# Patient Record
Sex: MALE | Race: WHITE | HISPANIC OR LATINO | URBAN - METROPOLITAN AREA
[De-identification: names, ages, dates, MRNs, and addresses within clinical notes are randomized per-mention and may not be internally consistent; named-entity substitution may affect disease eponyms.]

---

## 2020-08-11 ENCOUNTER — EMERGENCY (EMERGENCY)
Facility: HOSPITAL | Age: 55
LOS: 0 days | Discharge: HOME | End: 2020-08-11
Attending: STUDENT IN AN ORGANIZED HEALTH CARE EDUCATION/TRAINING PROGRAM | Admitting: STUDENT IN AN ORGANIZED HEALTH CARE EDUCATION/TRAINING PROGRAM
Payer: MEDICAID

## 2020-08-11 VITALS
DIASTOLIC BLOOD PRESSURE: 71 MMHG | SYSTOLIC BLOOD PRESSURE: 140 MMHG | TEMPERATURE: 99 F | OXYGEN SATURATION: 97 % | HEART RATE: 89 BPM | WEIGHT: 199.96 LBS | HEIGHT: 68 IN | RESPIRATION RATE: 18 BRPM

## 2020-08-11 DIAGNOSIS — F17.200 NICOTINE DEPENDENCE, UNSPECIFIED, UNCOMPLICATED: ICD-10-CM

## 2020-08-11 DIAGNOSIS — E11.9 TYPE 2 DIABETES MELLITUS WITHOUT COMPLICATIONS: ICD-10-CM

## 2020-08-11 DIAGNOSIS — E78.5 HYPERLIPIDEMIA, UNSPECIFIED: ICD-10-CM

## 2020-08-11 DIAGNOSIS — Z76.0 ENCOUNTER FOR ISSUE OF REPEAT PRESCRIPTION: ICD-10-CM

## 2020-08-11 DIAGNOSIS — Z79.4 LONG TERM (CURRENT) USE OF INSULIN: ICD-10-CM

## 2020-08-11 DIAGNOSIS — I10 ESSENTIAL (PRIMARY) HYPERTENSION: ICD-10-CM

## 2020-08-11 PROCEDURE — 99282 EMERGENCY DEPT VISIT SF MDM: CPT

## 2020-08-11 RX ORDER — INSULIN GLARGINE 100 [IU]/ML
0 INJECTION, SOLUTION SUBCUTANEOUS
Qty: 0 | Refills: 0 | DISCHARGE

## 2020-08-11 RX ORDER — INSULIN ASPART 100 [IU]/ML
0 INJECTION, SOLUTION SUBCUTANEOUS
Qty: 0 | Refills: 0 | DISCHARGE

## 2020-08-11 NOTE — ED PROVIDER NOTE - ATTENDING CONTRIBUTION TO CARE
I personally evaluated the patient. I reviewed the Resident’s or Physician Assistant’s note (as assigned above), and agree with the findings and plan except as documented in my note.    55 year old male with a pmh of insulin dependent diabetes, htn hld presents here for a medication refill & insurance. patient recently moved from [place] and had his medication refilled 3 weeks ago there. He takes 45 lantus, novolog 35 bid, simvistatin 40 lisinopril 40. Patient also states he broke his lantus bottle  and came to the ed to change his [place] medication to [place] medicaid. Patient otherwise has no complaints no fever chills cough cp sob n/v abdominal pain urinary frequency urgency burning.    On exam  CONSTITUTIONAL: WA / WN / NAD  HEAD: NCAT  EYES: PERRL; EOMI;   ENT: Normal pharynx; mucous membranes pink/moist, no erythema.  NECK: Supple; no meningeal signs  CARD: RRR; nl S1/S2; no M/R/G  RESP: Respiratory rate and effort are normal; breath sounds clear and equal bilaterally.  ABD: Soft, NT ND nl bowel sounds; no masses; no rebound  MSK/EXT: No gross deformities; full range of motion.  SKIN: Warm and dry;   NEURO: AAOx3  PSYCH: Memory Intact, Normal Affect

## 2020-08-11 NOTE — ED PROVIDER NOTE - PHYSICAL EXAMINATION
CONSTITUTIONAL: poor hygiene; disheveled appearing; cooperative.   SKIN: warm, dry  HEAD: Normocephalic; atraumatic.  EYES: EOMI, normal sclera and conjunctiva   ENT: No nasal discharge; airway clear.  NECK: Supple; non tender.  CARD: S1, S2 normal; no murmurs, gallops, or rubs. Regular rate and rhythm.   RESP: No wheezes, rales or rhonchi.  ABD: soft ntnd  EXT: Normal ROM.  No clubbing, cyanosis or edema.   LYMPH: No acute cervical adenopathy.  NEURO: Alert, oriented, grossly unremarkable  PSYCH: Cooperative, appropriate.

## 2020-08-11 NOTE — ED PROVIDER NOTE - NS ED ROS FT
Eyes:  No visual changes, eye pain or discharge.  ENMT:  No hearing changes, pain, discharge or infections. No neck pain or stiffness.  Cardiac:  No chest pain, SOB or edema. No chest pain with exertion.  Respiratory:  No cough or respiratory distress. No hemoptysis. No history of asthma or RAD.  GI:  No nausea, vomiting, diarrhea or abdominal pain.  :  No dysuria, frequency or burning.  MS:  No myalgia, muscle weakness, joint pain or back pain.  Neuro:  No headache or weakness.  No LOC.  Skin:  No skin rash.   Endocrine: No history of thyroid disease +hx of diabetes.

## 2020-08-11 NOTE — ED PROVIDER NOTE - CLINICAL SUMMARY MEDICAL DECISION MAKING FREE TEXT BOX
55 year old male with a pmh of insulin dependent diabetes, htn hld presents here for a medication refill & insurance. patient recently moved from [place] and had his medication refilled 3 weeks ago there. Patient came to ED in order to have his medication refilled and [place] med[place] insurance [place] medicaid. Financial / social work was planned to see patient however patient eloped.

## 2020-08-11 NOTE — ED PROVIDER NOTE - OBJECTIVE STATEMENT
The pt is a 55 year old male with a history of DM2, HTN presenting for "I need help getting insurance." Pt states he recently moved to [place] from [place] ~20 days ago. States he is almost out of his Novolog (takes 35 in morning and 35 at night) and Lantus (45 daily) and needs refills. Today he was opening his Novolog and it broke. Last took Lantus/Novolog yesterday. FSBG in ED 75. States he can't pay ~400$ for insulin and needs insurance here in [place]. No fevers, nausea/vomiting/diarrhea, abdominal pain, weight loss.

## 2024-10-30 VITALS
DIASTOLIC BLOOD PRESSURE: 64 MMHG | WEIGHT: 199.96 LBS | HEART RATE: 55 BPM | SYSTOLIC BLOOD PRESSURE: 131 MMHG | RESPIRATION RATE: 16 BRPM | HEIGHT: 68 IN | OXYGEN SATURATION: 96 %

## 2024-10-30 RX ORDER — SODIUM CHLORIDE 9 MG/ML
1000 INJECTION, SOLUTION INTRAMUSCULAR; INTRAVENOUS; SUBCUTANEOUS
Refills: 0 | Status: COMPLETED | OUTPATIENT
Start: 2024-10-31 | End: 2024-10-31

## 2024-10-30 RX ORDER — CLOPIDOGREL 75 MG/1
75 TABLET ORAL ONCE
Refills: 0 | Status: COMPLETED | OUTPATIENT
Start: 2024-10-31 | End: 2024-10-31

## 2024-10-30 RX ORDER — ASPIRIN/MAG CARB/ALUMINUM AMIN 325 MG
81 TABLET ORAL ONCE
Refills: 0 | Status: COMPLETED | OUTPATIENT
Start: 2024-10-31 | End: 2024-10-31

## 2024-10-30 NOTE — H&P ADULT - NSICDXPASTMEDICALHX_GEN_ALL_CORE_FT
PAST MEDICAL HISTORY:  CAD (coronary artery disease)     Diabetes     Hyperlipidemia     PVD (peripheral vascular disease)      PAST MEDICAL HISTORY:  CAD (coronary artery disease)     Hyperlipidemia     PVD (peripheral vascular disease)     Type 1 diabetes mellitus      PAST MEDICAL HISTORY:  CAD (coronary artery disease)     CVA (cerebrovascular accident)     Diabetic neuropathy     Hepatitis B     Hyperlipidemia     PVD (peripheral vascular disease)     Type 1 diabetes mellitus

## 2024-10-30 NOTE — H&P ADULT - CARDIOVASCULAR
regular rate and rhythm/S1 S2 present/no gallops/no rub/no murmur/no pedal edema/vascular regular rate and rhythm/S1 S2 present/no gallops/no rub/no murmur/no pedal edema/murmur/vascular

## 2024-10-30 NOTE — H&P ADULT - HISTORY OF PRESENT ILLNESS
Cardiologist: Dr. Cortez  Pharmacy:  Escort:       59 yr old M with PMHx of HTN, HPL, DM, Hep B, CAD s/p prior PCI (3/2024-per office note), PVD s/p prior angioplasty (3/2024) who initially presented to cardiologist c/o atypical chest pain____    Patient denies any N/V, diaphoresis, palpitations, dizziness, PND, orthopnea, LE edema, recent travel or sick contacts.     Patient further admits to claudication____    TTE 10/18/24: normal LV size/motion, EF:60%, trace to mild MR, trace TR, EF:60%.   Exercise Stress Test 10/29/24: moderate Sharpe treadmill score of -5, the exercise was terminated due to chest pain and dyspnea. There is 1mm depression on Lead II, III, AVF, V3-V5.    LE duplex 10/22/24: mild atherosclerosis disease throughout the lower extremities. Mild stenosis in bilateral femoral, popliteal artery, posterior tibilal, dorsalis pedis and right anterior tibial artery. There is limited study on the left anterior tibial.       In light of patients risk factors, CCS Angina Class III equivalent symptoms and claudication, above abnormal studies, patient now presents for recommended cardiac catheterization w/ possible intervention and LE angiogram.  Cardiologist: Dr. Cortez  Pharmacy:  Escort:     59M with PMHx of HTN, HPL, DM Type 1, Hep B, CAD s/p prior PCI (3/2024-per office note), PVD s/p prior angioplasty (3/2024) who initially presented to cardiologist c/o atypical chest pain per MD note as well as claudication. Pt reports recent peripheral angiogram this month at [facility] however unsure of results (collateral attempted with [facility] and OP Cardiologist).  Pt denies chest pain, SOB, OCAMPO,  palpitations, diaphoresis, orthopnea/PND, cough, leg swelling, dizziness, LOC, fall, syncope, N/V/D, fever/chills, hematuria, BRBPR, melena/hematochezia.    --TTE 10/18/24: normal LV size/motion, EF:60%, trace to mild MR, trace TR, EF:60%.   --Exercise Stress Test 10/29/24: moderate Sharpe treadmill score of -5, the exercise was terminated due to chest pain and dyspnea. There is 1mm depression on Lead II, III, AVF, V3-V5.  --LE duplex 10/22/24: mild atherosclerosis disease throughout the lower extremities. Mild stenosis in bilateral femoral, popliteal artery, posterior tibilal, dorsalis pedis and right anterior tibial artery. There is limited study on the left anterior tibial.     In light of risk factors, CCS Angina Class III equivalent symptoms, above abnormal studies, patient now presents for recommended cardiac catheterization w/ possible intervention.  Cardiologist: Dr. Cortez  Pharmacy: [facility]  Escort: brother, sister, or son  (each on call per pt)     59M with PMHx of HTN, HLD, DM Type 1, chronic Hepatitis B (dx at 3 y/o), CAD s/p prior PCI (3/2024-per office note), PAD s/p prior angioplasty (3/2024; recent Oct 2024 ?ballooning at [facility]) who initially presented to cardiologist c/o atypical chest pain per MD note as well as claudication. Pt reports recent peripheral angiogram this month at [facility] however unsure of results (collateral attempted with [facility] and OP Cardiologist, however unable to obtain).  Pt denies chest pain, SOB, OCAMPO,  palpitations, diaphoresis, orthopnea/PND, cough, leg swelling, dizziness, LOC, fall, syncope, N/V/D, fever/chills, hematuria, BRBPR, melena/hematochezia.    --TTE 10/18/24: normal LV size/motion, EF:60%, trace to mild MR, trace TR.   --Exercise Stress Test 10/29/24: moderate Sharpe treadmill score of -5, the exercise was terminated due to chest pain and dyspnea. There is 1mm depression on Lead II, III, AVF, V3-V5.  --LE duplex 10/22/24: mild atherosclerosis disease throughout the lower extremities. Mild stenosis in bilateral femoral, popliteal artery, posterior tibial dorsalis pedis and right anterior tibial artery. There is limited study on the left anterior tibial.     In light of risk factors, previous CAD, atypical chest pain, above abnormal studies, patient now presents for recommended cardiac catheterization w/ possible intervention.  Cardiologist: Dr. Cortez  Pharmacy: [facility]  Escort: brother, sister, or son  (each on call per pt)     59M with PMHx of HTN, HLD, DM Type 1 c/b neuropathy, chronic Hepatitis B (dx at 3 y/o), CAD s/p prior PCI (3/2024-per office note), PAD s/p prior angioplasty (3/2024; recent Oct 2024 ?ballooning at [facility]), mild CVA 2022 (attributed to hypoglycemia, no residual deficits), who initially presented to cardiologist c/o atypical chest pain per MD note as well as claudication. Pt reports recent peripheral angiogram this month at [facility] however unsure of results (collateral attempted with [facility] and OP Cardiologist, however unable to obtain).  Pt denies chest pain, SOB, OCAMPO,  palpitations, diaphoresis, orthopnea/PND, cough, leg swelling, dizziness, LOC, fall, syncope, N/V/D, fever/chills, hematuria, BRBPR, melena/hematochezia.    --TTE 10/18/24: normal LV size/motion, EF:60%, trace to mild MR, trace TR.   --Exercise Stress Test 10/29/24: moderate Sharpe treadmill score of -5, the exercise was terminated due to chest pain and dyspnea. There is 1mm depression on Lead II, III, AVF, V3-V5.  --LE duplex 10/22/24: mild atherosclerosis disease throughout the lower extremities. Mild stenosis in bilateral femoral, popliteal artery, posterior tibial dorsalis pedis and right anterior tibial artery. There is limited study on the left anterior tibial.     In light of risk factors, previous CAD, atypical chest pain, above abnormal studies, patient now presents for recommended cardiac catheterization w/ possible intervention.

## 2024-10-30 NOTE — H&P ADULT - NSHPLABSRESULTS_GEN_ALL_CORE
12.9   7.16  )-----------( 262      ( 31 Oct 2024 12:44 )             40.0       10-31    141  |  104  |  21  ----------------------------<  118[H]  4.1   |  26  |  1.16    Ca    9.3      31 Oct 2024 12:44  Mg     2.1     10-31        PT/INR - ( 31 Oct 2024 12:44 )   PT: 11.2 sec;   INR: 0.96          PTT - ( 31 Oct 2024 12:44 )  PTT:30.7 sec    CARDIAC MARKERS ( 31 Oct 2024 12:44 )  x     / x     / x     / x     / 2.0 ng/mL        Urinalysis Basic - ( 31 Oct 2024 12:44 )    Color: x / Appearance: x / SG: x / pH: x  Gluc: 118 mg/dL / Ketone: x  / Bili: x / Urobili: x   Blood: x / Protein: x / Nitrite: x   Leuk Esterase: x / RBC: x / WBC x   Sq Epi: x / Non Sq Epi: x / Bacteria: x

## 2024-10-31 ENCOUNTER — OUTPATIENT (OUTPATIENT)
Dept: OUTPATIENT SERVICES | Facility: HOSPITAL | Age: 59
LOS: 1 days | Discharge: HOME | End: 2024-10-31
Payer: MEDICARE

## 2024-10-31 DIAGNOSIS — Z95.5 PRESENCE OF CORONARY ANGIOPLASTY IMPLANT AND GRAFT: Chronic | ICD-10-CM

## 2024-10-31 LAB
A1C WITH ESTIMATED AVERAGE GLUCOSE RESULT: 7.7 % — HIGH (ref 4–5.6)
ANION GAP SERPL CALC-SCNC: 11 MMOL/L — SIGNIFICANT CHANGE UP (ref 5–17)
APTT BLD: 30.7 SEC — SIGNIFICANT CHANGE UP (ref 24.5–35.6)
BASOPHILS # BLD AUTO: 0.07 K/UL — SIGNIFICANT CHANGE UP (ref 0–0.2)
BASOPHILS NFR BLD AUTO: 1 % — SIGNIFICANT CHANGE UP (ref 0–2)
BUN SERPL-MCNC: 21 MG/DL — SIGNIFICANT CHANGE UP (ref 7–23)
CALCIUM SERPL-MCNC: 9.3 MG/DL — SIGNIFICANT CHANGE UP (ref 8.4–10.5)
CHLORIDE SERPL-SCNC: 104 MMOL/L — SIGNIFICANT CHANGE UP (ref 96–108)
CHOLEST SERPL-MCNC: 182 MG/DL — SIGNIFICANT CHANGE UP
CK MB CFR SERPL CALC: 2 NG/ML — SIGNIFICANT CHANGE UP (ref 0–6.7)
CK SERPL-CCNC: 129 U/L — SIGNIFICANT CHANGE UP (ref 30–200)
CO2 SERPL-SCNC: 26 MMOL/L — SIGNIFICANT CHANGE UP (ref 22–31)
CREAT SERPL-MCNC: 1.16 MG/DL — SIGNIFICANT CHANGE UP (ref 0.5–1.3)
EGFR: 72 ML/MIN/1.73M2 — SIGNIFICANT CHANGE UP
EOSINOPHIL # BLD AUTO: 0.32 K/UL — SIGNIFICANT CHANGE UP (ref 0–0.5)
EOSINOPHIL NFR BLD AUTO: 4.5 % — SIGNIFICANT CHANGE UP (ref 0–6)
ESTIMATED AVERAGE GLUCOSE: 174 MG/DL — HIGH (ref 68–114)
GLUCOSE BLDC GLUCOMTR-MCNC: 108 MG/DL — HIGH (ref 70–99)
GLUCOSE BLDC GLUCOMTR-MCNC: 116 MG/DL — HIGH (ref 70–99)
GLUCOSE BLDC GLUCOMTR-MCNC: 141 MG/DL — HIGH (ref 70–99)
GLUCOSE BLDC GLUCOMTR-MCNC: 47 MG/DL — CRITICAL LOW (ref 70–99)
GLUCOSE BLDC GLUCOMTR-MCNC: 50 MG/DL — CRITICAL LOW (ref 70–99)
GLUCOSE BLDC GLUCOMTR-MCNC: 82 MG/DL — SIGNIFICANT CHANGE UP (ref 70–99)
GLUCOSE SERPL-MCNC: 118 MG/DL — HIGH (ref 70–99)
HCT VFR BLD CALC: 40 % — SIGNIFICANT CHANGE UP (ref 39–50)
HDLC SERPL-MCNC: 74 MG/DL — SIGNIFICANT CHANGE UP
HGB BLD-MCNC: 12.9 G/DL — LOW (ref 13–17)
IMM GRANULOCYTES NFR BLD AUTO: 0.3 % — SIGNIFICANT CHANGE UP (ref 0–0.9)
INR BLD: 0.96 — SIGNIFICANT CHANGE UP (ref 0.85–1.16)
LIPID PNL WITH DIRECT LDL SERPL: 94 MG/DL — SIGNIFICANT CHANGE UP
LYMPHOCYTES # BLD AUTO: 1.64 K/UL — SIGNIFICANT CHANGE UP (ref 1–3.3)
LYMPHOCYTES # BLD AUTO: 22.9 % — SIGNIFICANT CHANGE UP (ref 13–44)
MAGNESIUM SERPL-MCNC: 2.1 MG/DL — SIGNIFICANT CHANGE UP (ref 1.6–2.6)
MCHC RBC-ENTMCNC: 30 PG — SIGNIFICANT CHANGE UP (ref 27–34)
MCHC RBC-ENTMCNC: 32.3 G/DL — SIGNIFICANT CHANGE UP (ref 32–36)
MCV RBC AUTO: 93 FL — SIGNIFICANT CHANGE UP (ref 80–100)
MONOCYTES # BLD AUTO: 0.75 K/UL — SIGNIFICANT CHANGE UP (ref 0–0.9)
MONOCYTES NFR BLD AUTO: 10.5 % — SIGNIFICANT CHANGE UP (ref 2–14)
NEUTROPHILS # BLD AUTO: 4.36 K/UL — SIGNIFICANT CHANGE UP (ref 1.8–7.4)
NEUTROPHILS NFR BLD AUTO: 60.8 % — SIGNIFICANT CHANGE UP (ref 43–77)
NON HDL CHOLESTEROL: 108 MG/DL — SIGNIFICANT CHANGE UP
NRBC # BLD: 0 /100 WBCS — SIGNIFICANT CHANGE UP (ref 0–0)
PLATELET # BLD AUTO: 262 K/UL — SIGNIFICANT CHANGE UP (ref 150–400)
POTASSIUM SERPL-MCNC: 4.1 MMOL/L — SIGNIFICANT CHANGE UP (ref 3.5–5.3)
POTASSIUM SERPL-SCNC: 4.1 MMOL/L — SIGNIFICANT CHANGE UP (ref 3.5–5.3)
PROTHROM AB SERPL-ACNC: 11.2 SEC — SIGNIFICANT CHANGE UP (ref 9.9–13.4)
RBC # BLD: 4.3 M/UL — SIGNIFICANT CHANGE UP (ref 4.2–5.8)
RBC # FLD: 13.8 % — SIGNIFICANT CHANGE UP (ref 10.3–14.5)
SODIUM SERPL-SCNC: 141 MMOL/L — SIGNIFICANT CHANGE UP (ref 135–145)
TRIGL SERPL-MCNC: 78 MG/DL — SIGNIFICANT CHANGE UP
WBC # BLD: 7.16 K/UL — SIGNIFICANT CHANGE UP (ref 3.8–10.5)
WBC # FLD AUTO: 7.16 K/UL — SIGNIFICANT CHANGE UP (ref 3.8–10.5)

## 2024-10-31 PROCEDURE — 82553 CREATINE MB FRACTION: CPT

## 2024-10-31 PROCEDURE — 93458 L HRT ARTERY/VENTRICLE ANGIO: CPT | Mod: 26

## 2024-10-31 PROCEDURE — 83735 ASSAY OF MAGNESIUM: CPT

## 2024-10-31 PROCEDURE — 85730 THROMBOPLASTIN TIME PARTIAL: CPT

## 2024-10-31 PROCEDURE — 99152 MOD SED SAME PHYS/QHP 5/>YRS: CPT

## 2024-10-31 PROCEDURE — 83036 HEMOGLOBIN GLYCOSYLATED A1C: CPT

## 2024-10-31 PROCEDURE — 93005 ELECTROCARDIOGRAM TRACING: CPT

## 2024-10-31 PROCEDURE — C1769: CPT

## 2024-10-31 PROCEDURE — 82550 ASSAY OF CK (CPK): CPT

## 2024-10-31 PROCEDURE — 80048 BASIC METABOLIC PNL TOTAL CA: CPT

## 2024-10-31 PROCEDURE — C1894: CPT

## 2024-10-31 PROCEDURE — 85025 COMPLETE CBC W/AUTO DIFF WBC: CPT

## 2024-10-31 PROCEDURE — 82962 GLUCOSE BLOOD TEST: CPT

## 2024-10-31 PROCEDURE — 85610 PROTHROMBIN TIME: CPT

## 2024-10-31 PROCEDURE — C1887: CPT

## 2024-10-31 PROCEDURE — 93458 L HRT ARTERY/VENTRICLE ANGIO: CPT

## 2024-10-31 PROCEDURE — 80061 LIPID PANEL: CPT

## 2024-10-31 PROCEDURE — 93010 ELECTROCARDIOGRAM REPORT: CPT

## 2024-10-31 RX ORDER — SODIUM CHLORIDE 9 MG/ML
1000 INJECTION, SOLUTION INTRAMUSCULAR; INTRAVENOUS; SUBCUTANEOUS
Refills: 0 | Status: ACTIVE | OUTPATIENT
Start: 2024-10-31 | End: 2024-10-31

## 2024-10-31 RX ORDER — INSULIN ASPART 100 [IU]/ML
15 INJECTION, SOLUTION INTRAVENOUS; SUBCUTANEOUS
Refills: 0 | DISCHARGE

## 2024-10-31 RX ORDER — GLUCAGON INJECTION, SOLUTION 1 MG/.2ML
1 INJECTION, SOLUTION SUBCUTANEOUS ONCE
Refills: 0 | Status: ACTIVE | OUTPATIENT
Start: 2024-10-31 | End: 2025-09-29

## 2024-10-31 RX ORDER — CHLORHEXIDINE GLUCONATE 40 MG/ML
1 SOLUTION TOPICAL ONCE
Refills: 0 | Status: ACTIVE | OUTPATIENT
Start: 2024-10-31

## 2024-10-31 RX ORDER — GABAPENTIN 300 MG/1
0 CAPSULE ORAL
Refills: 0 | DISCHARGE

## 2024-10-31 RX ORDER — ASPIRIN/MAG CARB/ALUMINUM AMIN 325 MG
1 TABLET ORAL
Refills: 0 | DISCHARGE

## 2024-10-31 RX ORDER — INSULIN LISPRO 100/ML
VIAL (ML) SUBCUTANEOUS
Refills: 0 | Status: ACTIVE | OUTPATIENT
Start: 2024-10-31 | End: 2025-09-29

## 2024-10-31 RX ORDER — AMLODIPINE BESYLATE 10 MG
1 TABLET ORAL
Refills: 0 | DISCHARGE

## 2024-10-31 RX ORDER — LISINOPRIL 40 MG
1 TABLET ORAL
Refills: 0 | DISCHARGE

## 2024-10-31 RX ORDER — SODIUM CHLORIDE 9 MG/ML
250 INJECTION, SOLUTION INTRAMUSCULAR; INTRAVENOUS; SUBCUTANEOUS ONCE
Refills: 0 | Status: COMPLETED | OUTPATIENT
Start: 2024-10-31 | End: 2024-10-31

## 2024-10-31 RX ORDER — RANOLAZINE 500 MG/1
1 TABLET, FILM COATED, EXTENDED RELEASE ORAL
Refills: 0 | DISCHARGE

## 2024-10-31 RX ORDER — CLOPIDOGREL 75 MG/1
1 TABLET ORAL
Refills: 0 | DISCHARGE

## 2024-10-31 RX ORDER — INSULIN LISPRO 100/ML
VIAL (ML) SUBCUTANEOUS AT BEDTIME
Refills: 0 | Status: ACTIVE | OUTPATIENT
Start: 2024-10-31 | End: 2025-09-29

## 2024-10-31 RX ORDER — BISOPROLOL FUMARATE 10 MG/1
1 TABLET, FILM COATED ORAL
Refills: 0 | DISCHARGE

## 2024-10-31 RX ADMIN — Medication 81 MILLIGRAM(S): at 15:10

## 2024-10-31 RX ADMIN — Medication 25 GRAM(S): at 18:25

## 2024-10-31 RX ADMIN — CLOPIDOGREL 75 MILLIGRAM(S): 75 TABLET ORAL at 15:10

## 2024-10-31 RX ADMIN — SODIUM CHLORIDE 75 MILLILITER(S): 9 INJECTION, SOLUTION INTRAMUSCULAR; INTRAVENOUS; SUBCUTANEOUS at 15:09

## 2024-10-31 RX ADMIN — SODIUM CHLORIDE 75 MILLILITER(S): 9 INJECTION, SOLUTION INTRAMUSCULAR; INTRAVENOUS; SUBCUTANEOUS at 14:17

## 2024-10-31 RX ADMIN — SODIUM CHLORIDE 500 MILLILITER(S): 9 INJECTION, SOLUTION INTRAMUSCULAR; INTRAVENOUS; SUBCUTANEOUS at 14:17

## 2024-10-31 RX ADMIN — Medication 12.5 GRAM(S): at 19:10

## 2024-10-31 NOTE — CHART NOTE - NSCHARTNOTEFT_GEN_A_CORE
Writer notified & called to bedside for hypoglycemia event at 1700. Pt is T1DM, and a hypoglycemia event occurred during cath lab holding while pt waiting for procedure and NPO. Pt remained alert and VSS. 25g of D50% was administered with resolution of FSBG from 47 to 141.  Prior to this event, it was not known pt was wearing insulin pump for T1DM with continuous Novolog administration.  The pump was now turned off via pt's handheld monitor.  FSBG continued to be monitored closely via pt's Dexcom and intermittent FSBG.     Cath RN Dominique at bedside assisting with care; pt and RN aware of plan. Dr. Villasenor notified and presented to bedside.     1800: Insulin Pump form completed with pt.  1900: Pt's Dexcom had been closely monitored at bedside, noted w/ blood glucose dropping to 100s, to 90s, to 82.  12.5g D50% given Pre-procedure.  19:34 re-check FSBG 108. Writer notified & called to bedside for hypoglycemia event at 1700. Pt is T1DM, and a hypoglycemia event occurred during cath lab holding while pt waiting for procedure and NPO. Pt remained alert and VSS. 25g of D50% was administered with resolution of FSBG from 47 to 141.  Prior to this event, it was not known pt was wearing insulin pump for T1DM with continuous Novolog administration.  The pump was now turned off via pt's handheld monitor.  FSBG continued to be monitored closely via pt's Dexcom and intermittent FSBG.     Cath RN Dominique at bedside assisting with care; pt and RN aware of plan. Dr. Villasenor notified and presented to bedside.     In interim, Insulin Pump form completed with pt.    1900: Pt's Dexcom had been closely monitored at bedside, noted w/ blood glucose dropping to 100s, to 90s, to 82.  12.5g D50% given Pre-procedure.  19:34 re-check FSBG 108.

## 2024-10-31 NOTE — PROGRESS NOTE ADULT - SUBJECTIVE AND OBJECTIVE BOX
Interventional Cardiology PA Post PCI SDA Discharge Note    Patient without complaints. Ambulated and voided without difficulties    Afebrile, VSS    PRESCRIPTIONS/HOME MEDICATIONS:  amLODIPine 5 mg oral tablet: 1 tab(s) orally once a day  Aspirin EC 81 mg oral delayed release tablet: 1 tab(s) orally once a day  atorvastatin 40 mg oral tablet: 1 tab(s) orally once a day (at bedtime)  atorvastatin 80 mg oral tablet: 1 tab(s) orally once a day (at bedtime)  bisoprolol 5 mg oral tablet: 1 tab(s) orally once a day  gabapentin 100 mg oral tablet: orally 2 times a day  lisinopril 40 mg oral tablet: 1 tab(s) orally once a day  NovoLOG: continuous Omnipod insulin pump on Right thigh (0.35 units/hr 12a-7a; 7a-12a 1 unit per hour)  NovoLOG 100 units/mL subcutaneous solution: 15 unit(s) subcutaneous 2 times a day (with meals) based on FSBG/meal, usually takes between 15-20 units.  Plavix 75 mg oral tablet: 1 tab(s) orally once a day  Ranexa 500 mg oral tablet, extended release: 1 tab(s) orally 2 times a day        Ext:    	Right/Left             Groin:       hematoma,     bruit, dressing; C/D/I  		Right/Left             Radial :    hematoma,     bleeding, dressing; C/D/I      Pulses:    intact RAD/DP/PT to baseline     A/P:  s/p PCI: PTCA/CSI Atherectomy/Rotational Atherectomy/BMS/BABS    1.	  Follow-up with PMD/Cardiologist ___________ in 72 hours.  2.       Post procedure labs/EKG reviewed and stable.    3.       Pt given instructions on importance of taking antiplatelet medication(s).    4. 	  Stable for discharge as per attending Dr. _________ after bed rest, pt voids, groin/wrist stable and 30 minutes of ambulation.  5.       Prescriptions for Aspirin/Plavix/Brilinta/Effient e-prescribed and submitted to patient's pharmacy Yes/No_______.  No Aspirin/Plavix/Brilinta/Effient prescribed due to ____________.  6.       Patient will continue/Start Statin (Name and dose).  No Statin Prescribed due to ____________.  7.       Patient will continue All Other Home Medications.  (PLEASE NOTE IF ANY CHANGES).  8.       Is patient a Current Smoker: Yes/No?  If yes, Patient was Counseled on importance of smoking cessation. Yes  9.	             *Education on benefits of Cardiac Rehab provided to patient: Yes         *Referral and Prescription Given for Cardiac Rehab: Yes/No.  If No, Why Not?  (see reasons below)         *Pt given list of locations & instructed to contact their insurance company to review list of participating providers. Yes          *Pt instructed to bring Cardiac Rehab prescription with them to Cardiology Follow up appointment for assistance with enrollment: Yes         *Pt discharge copies detail cardiovascular history, medications, testing/treatments OR pt has created a patient portal account and instructed to provider their records at their 1st appointment: Yes    *** Reasons for No Cardiac Rehab Referral Rx (Document 1 or more options):                Patient Refused            Medical Reason: ex has Home Care, Home PT, incomplete revascularization            Patient lacks medical coverage for Cardiac Rehab            Pt discharged to Nursing Care/[facility]/Long term Care Facility            Patient Lacks Transportation or no cardiac rehab within 60 minutes driving range            Patient already participates in Cardiac Rehab            Other: (provide details) ex: Hospice patient    10. CVD ( (STEMI/NSTEMI/ACS/UA &/OR Post PCI this admission): GLP-1 receptor agonist, SGLT2 inhibitor meds discussed w/ patient and encouraged to discuss further with PMD or endo at next visit: Yes  11. Discharge forms signed and copies in chart        Interventional Cardiology PA Post Dx Cath SDA Discharge Note    Patient without complaints. Ambulated and voided without difficulties    Afebrile, VSS    PRESCRIPTIONS/HOME MEDICATIONS:  amLODIPine 5 mg oral tablet: 1 tab(s) orally once a day  Aspirin EC 81 mg oral delayed release tablet: 1 tab(s) orally once a day  atorvastatin 40 mg oral tablet: 1 tab(s) orally once a day (at bedtime)  atorvastatin 80 mg oral tablet: 1 tab(s) orally once a day (at bedtime)  bisoprolol 5 mg oral tablet: 1 tab(s) orally once a day  gabapentin 100 mg oral tablet: orally 2 times a day  lisinopril 40 mg oral tablet: 1 tab(s) orally once a day  NovoLOG: continuous Omnipod insulin pump on Right thigh (0.35 units/hr 12a-7a; 7a-12a 1 unit per hour)  NovoLOG 100 units/mL subcutaneous solution: 15 unit(s) subcutaneous 2 times a day (with meals) based on FSBG/meal, usually takes between 15-20 units.  Plavix 75 mg oral tablet: 1 tab(s) orally once a day  Ranexa 500 mg oral tablet, extended release: 1 tab(s) orally 2 times a day    Ext: Right Groin: NO hematoma, bruit, dressing; C/D/I    Pulses: intact DP/PT to baseline     A/P: 59M with PMHx of HTN, HLD, DM Type 1 c/b neuropathy, chronic Hepatitis B (dx at 3 y/o), CAD s/p prior PCI (3/2024-per office note), PAD s/p prior angioplasty (3/2024; recent Oct 2024 ?ballooning at [facility]), mild CVA 2022 (attributed to hypoglycemia, no residual deficits), who initially presented to cardiologist c/o atypical chest pain per MD note as well as claudication. Pt reports recent peripheral angiogram this month at [facility] however unsure of results (collateral attempted with [facility] and OP Cardiologist, however unable to obtain); who in light of risk factors, previous CAD, atypical chest pain, above abnormal studies, patient presented to [facility] for recommended cardiac catheterization w/ possible intervention.     Pt s/p dx [facility] (10/31/24): LM normal. pLAD stent patent w/ mild ISR. D1 mild diffuse dz. pLCx 30%, followed by dLCx 50%. OM1 mild diffuse. pRCA 60% (FFR negative). RPDA mild diffuse dz. LVEDP 15 mmHg. IC/Fellow: Dr. Villasenor/Dr. Pina Jang.    1. Follow-up with PMD/Cardiologist Dr. Villasenor in 1-2 weeks  2. Stable for discharge as per attending Dr. Cortez after bed rest, pt voids, groin stable and 30 minutes of ambulation.  3. Patient will INCREASE to atorvastatin 80 mg PO qhs (LDL 94). Sent to pt's pharmacy.  4. Patient will continue all other home meds. C/w home ASA 81 mg PO qd and Plavix 75 mg PO qd.   5. Discharge forms signed and copies in chart        Interventional Cardiology PA Post Dx Cath SDA Discharge Note    Patient without complaints. Ambulated and voided without difficulties    Afebrile, VSS    PRESCRIPTIONS/HOME MEDICATIONS:  amLODIPine 5 mg oral tablet: 1 tab(s) orally once a day  Aspirin EC 81 mg oral delayed release tablet: 1 tab(s) orally once a day  atorvastatin 40 mg oral tablet: 1 tab(s) orally once a day (at bedtime)  atorvastatin 80 mg oral tablet: 1 tab(s) orally once a day (at bedtime)  bisoprolol 5 mg oral tablet: 1 tab(s) orally once a day  gabapentin 100 mg oral tablet: orally 2 times a day  lisinopril 40 mg oral tablet: 1 tab(s) orally once a day  NovoLOG: continuous Omnipod insulin pump on Right thigh (0.35 units/hr 12a-7a; 7a-12a 1 unit per hour)  NovoLOG 100 units/mL subcutaneous solution: 15 unit(s) subcutaneous 2 times a day (with meals) based on FSBG/meal, usually takes between 15-20 units.  Plavix 75 mg oral tablet: 1 tab(s) orally once a day  Ranexa 500 mg oral tablet, extended release: 1 tab(s) orally 2 times a day    Ext: Right Radial: NO hematoma, bruit, dressing; C/D/I    Pulses: intact radial to baseline     A/P: 59M with PMHx of HTN, HLD, DM Type 1 c/b neuropathy, chronic Hepatitis B (dx at 3 y/o), CAD s/p prior PCI (3/2024-per office note), PAD s/p prior angioplasty (3/2024; recent Oct 2024 ?ballooning at [facility]), mild CVA 2022 (attributed to hypoglycemia, no residual deficits), who initially presented to cardiologist c/o atypical chest pain per MD note as well as claudication. Pt reports recent peripheral angiogram this month at [facility] however unsure of results (collateral attempted with [facility] and OP Cardiologist, however unable to obtain); who in light of risk factors, previous CAD, atypical chest pain, above abnormal studies, patient presented to [facility] for recommended cardiac catheterization w/ possible intervention.     Pt s/p dx [facility] (10/31/24): LM normal. pLAD stent patent w/ mild ISR. D1 mild diffuse dz. pLCx 30%, followed by dLCx 50%. OM1 mild diffuse. pRCA 60% (FFR negative). RPDA mild diffuse dz. LVEDP 15 mmHg. IC/Fellow: Dr. Villasenor/Dr. Pina Jang.    1. Follow-up with PMD/Cardiologist Dr. Villasenor in 1-2 weeks  2. Stable for discharge as per attending Dr. Cortez after bed rest, pt voids, groin stable and 30 minutes of ambulation.  3. Patient will INCREASE to atorvastatin 80 mg PO qhs (LDL 94). Sent to pt's pharmacy.  4. Patient will continue all other home meds. C/w home ASA 81 mg PO qd and Plavix 75 mg PO qd.   5. Discharge forms signed and copies in chart        Interventional Cardiology PA Post Dx Cath SDA Discharge Note    Patient without complaints. Ambulated and voided without difficulties    Afebrile, VSS    PRESCRIPTIONS/HOME MEDICATIONS:  amLODIPine 5 mg oral tablet: 1 tab(s) orally once a day  Aspirin EC 81 mg oral delayed release tablet: 1 tab(s) orally once a day  atorvastatin 40 mg oral tablet: 1 tab(s) orally once a day (at bedtime)  atorvastatin 80 mg oral tablet: 1 tab(s) orally once a day (at bedtime)  bisoprolol 5 mg oral tablet: 1 tab(s) orally once a day  gabapentin 100 mg oral tablet: orally 2 times a day  lisinopril 40 mg oral tablet: 1 tab(s) orally once a day  NovoLOG: continuous Omnipod insulin pump on Right thigh (0.35 units/hr 12a-7a; 7a-12a 1 unit per hour)  NovoLOG 100 units/mL subcutaneous solution: 15 unit(s) subcutaneous 2 times a day (with meals) based on FSBG/meal, usually takes between 15-20 units.  Plavix 75 mg oral tablet: 1 tab(s) orally once a day  Ranexa 500 mg oral tablet, extended release: 1 tab(s) orally 2 times a day    Ext: Right Radial: NO hematoma, dressing; C/D/I    Pulses: intact radial to baseline     A/P: 59M with PMHx of HTN, HLD, DM Type 1 c/b neuropathy, chronic Hepatitis B (dx at 3 y/o), CAD s/p prior PCI (3/2024-per office note), PAD s/p prior angioplasty (3/2024; recent Oct 2024 ?ballooning at [facility]), mild CVA 2022 (attributed to hypoglycemia, no residual deficits), who initially presented to cardiologist c/o atypical chest pain per MD note as well as claudication. Pt reports recent peripheral angiogram this month at [facility] however unsure of results (collateral attempted with [facility] and OP Cardiologist, however unable to obtain); who in light of risk factors, previous CAD, atypical chest pain, above abnormal studies, patient presented to [facility] for recommended cardiac catheterization w/ possible intervention.     Pt s/p dx C (10/31/24): LM normal. pLAD stent patent w/ mild ISR. D1 mild diffuse dz. pLCx 30%, followed by dLCx 50%. OM1 mild diffuse. pRCA 60% (FFR negative). RPDA mild diffuse dz. LVEDP 15 mmHg. IC/Fellow: Dr. Villasenor/Dr. Pina Jang.    1. Follow-up with PMD/Cardiologist Dr. Villasenor in 1-2 weeks  2. Stable for discharge as per attending Dr. Cortez after bed rest, pt voids, groin stable and 30 minutes of ambulation.  3. Patient will INCREASE to atorvastatin 80 mg PO qhs (LDL 94). Sent to pt's pharmacy.  4. Patient will continue all other home meds. C/w home ASA 81 mg PO qd and Plavix 75 mg PO qd.   5. Discharge forms signed and copies in chart        Interventional Cardiology PA Post Dx Cath SDA Discharge Note    Patient without complaints. Ambulated and voided without difficulties    Afebrile, VSS    PRESCRIPTIONS/HOME MEDICATIONS:  amLODIPine 5 mg oral tablet: 1 tab(s) orally once a day  Aspirin EC 81 mg oral delayed release tablet: 1 tab(s) orally once a day  atorvastatin 40 mg oral tablet: 1 tab(s) orally once a day (at bedtime)  atorvastatin 80 mg oral tablet: 1 tab(s) orally once a day (at bedtime)  bisoprolol 5 mg oral tablet: 1 tab(s) orally once a day  gabapentin 100 mg oral tablet: orally 2 times a day  lisinopril 40 mg oral tablet: 1 tab(s) orally once a day  NovoLOG: continuous Omnipod insulin pump on Right thigh (0.35 units/hr 12a-7a; 7a-12a 1 unit per hour)  NovoLOG 100 units/mL subcutaneous solution: 15 unit(s) subcutaneous 2 times a day (with meals) based on FSBG/meal, usually takes between 15-20 units.  Plavix 75 mg oral tablet: 1 tab(s) orally once a day  Ranexa 500 mg oral tablet, extended release: 1 tab(s) orally 2 times a day    Ext: Right Radial: NO hematoma, dressing; C/D/I    Pulses: intact radial to baseline     A/P: 59M with PMHx of HTN, HLD, DM Type 1 c/b neuropathy, chronic Hepatitis B (dx at 3 y/o), CAD s/p prior PCI (3/2024-per office note), PAD s/p prior angioplasty (3/2024; recent Oct 2024 ?ballooning at [facility]), mild CVA 2022 (attributed to hypoglycemia, no residual deficits), who initially presented to cardiologist c/o atypical chest pain per MD note as well as claudication. Pt reports recent peripheral angiogram this month at [facility] however unsure of results (collateral attempted with [facility] and OP Cardiologist, however unable to obtain); who in light of risk factors, previous CAD, atypical chest pain, above abnormal studies, patient presented to [facility] for recommended cardiac catheterization w/ possible intervention.     Pt s/p dx C (10/31/24): LM normal. pLAD stent patent w/ mild ISR. D1 mild diffuse dz. pLCx 30%, followed by dLCx 50%. OM1 mild diffuse. pRCA 60% (FFR negative). RPDA mild diffuse dz. LVEDP 15 mmHg. IC/Fellow: Dr. Villasenor/Dr. Pina Jang.    1. Follow-up with PMD/Cardiologist Dr. Villasenor in 1-2 weeks  2. Stable for discharge as per attending Dr. Cortez after bed rest, pt voids, groin stable and 30 minutes of ambulation.  3. Patient will INCREASE to atorvastatin 80 mg PO qhs (LDL 94). Sent to pt's pharmacy.  4. Patient will continue all other home meds. C/w home ASA 81 mg PO qd and Plavix 75 mg PO qd.   5. Pt is NOT a current smoker  6. Discharge forms signed and copies in chart        Interventional Cardiology PA Post Dx Cath SDA Discharge Note    Patient without complaints. Ambulated and voided without difficulties    Afebrile, VSS    PRESCRIPTIONS/HOME MEDICATIONS:  amLODIPine 5 mg oral tablet: 1 tab(s) orally once a day  Aspirin EC 81 mg oral delayed release tablet: 1 tab(s) orally once a day  atorvastatin 40 mg oral tablet: 1 tab(s) orally once a day (at bedtime)  atorvastatin 80 mg oral tablet: 1 tab(s) orally once a day (at bedtime)  bisoprolol 5 mg oral tablet: 1 tab(s) orally once a day  gabapentin 100 mg oral tablet: orally 2 times a day  lisinopril 40 mg oral tablet: 1 tab(s) orally once a day  NovoLOG: continuous Omnipod insulin pump on Right thigh (0.35 units/hr 12a-7a; 7a-12a 1 unit per hour)  NovoLOG 100 units/mL subcutaneous solution: 15 unit(s) subcutaneous 2 times a day (with meals) based on FSBG/meal, usually takes between 15-20 units.  Plavix 75 mg oral tablet: 1 tab(s) orally once a day  Ranexa 500 mg oral tablet, extended release: 1 tab(s) orally 2 times a day    Ext: Right Radial: NO hematoma, dressing; C/D/I    Pulses: intact radial to baseline     A/P: 59M with PMHx of HTN, HLD, DM Type 1 c/b neuropathy, chronic Hepatitis B (dx at 3 y/o), CAD s/p prior PCI (3/2024-per office note), PAD s/p prior angioplasty (3/2024; recent Oct 2024 ?ballooning at [facility]), mild CVA 2022 (attributed to hypoglycemia, no residual deficits), who initially presented to cardiologist c/o atypical chest pain per MD note as well as claudication. Pt reports recent peripheral angiogram this month at [facility] however unsure of results (collateral attempted with [facility] and OP Cardiologist, however unable to obtain); who in light of risk factors, previous CAD, atypical chest pain, above abnormal studies, patient presented to [facility] for recommended cardiac catheterization w/ possible intervention.     Pt s/p dx C (10/31/24): LM normal. pLAD stent patent w/ mild ISR. D1 mild diffuse dz. pLCx 30%, followed by dLCx 50%. OM1 mild diffuse. pRCA 60% (FFR negative). RPDA mild diffuse dz. LVEDP 15 mmHg. IC/Fellow: Dr. Villasenor/Dr. Pina Jang. Pt did not receive sedation in the case b/c he was known to not have an escort pre-cath.    1. Follow-up with PMD/Cardiologist Dr. Villasenor in 1-2 weeks  2. Stable for discharge as per attending Dr. Cortez after bed rest, pt voids, groin stable and 30 minutes of ambulation.  3. Patient will INCREASE to atorvastatin 80 mg PO qhs (LDL 94). Sent to pt's pharmacy.  4. Patient will continue all other home meds. C/w home ASA 81 mg PO qd and Plavix 75 mg PO qd.   5. Pt is NOT a current smoker  6. Discharge forms signed and copies in chart

## 2024-11-06 DIAGNOSIS — I25.110 ATHEROSCLEROTIC HEART DISEASE OF NATIVE CORONARY ARTERY WITH UNSTABLE ANGINA PECTORIS: ICD-10-CM

## 2024-11-06 DIAGNOSIS — R94.39 ABNORMAL RESULT OF OTHER CARDIOVASCULAR FUNCTION STUDY: ICD-10-CM
